# Patient Record
Sex: MALE | Race: WHITE | NOT HISPANIC OR LATINO | Employment: OTHER | URBAN - METROPOLITAN AREA
[De-identification: names, ages, dates, MRNs, and addresses within clinical notes are randomized per-mention and may not be internally consistent; named-entity substitution may affect disease eponyms.]

---

## 2018-04-04 ENCOUNTER — OFFICE VISIT CONVERTED (OUTPATIENT)
Dept: FAMILY MEDICINE CLINIC | Facility: CLINIC | Age: 68
End: 2018-04-04
Attending: FAMILY MEDICINE

## 2018-04-04 ENCOUNTER — CONVERSION ENCOUNTER (OUTPATIENT)
Dept: FAMILY MEDICINE CLINIC | Facility: CLINIC | Age: 68
End: 2018-04-04

## 2018-05-16 ENCOUNTER — CONVERSION ENCOUNTER (OUTPATIENT)
Dept: FAMILY MEDICINE CLINIC | Facility: CLINIC | Age: 68
End: 2018-05-16

## 2018-05-16 ENCOUNTER — OFFICE VISIT CONVERTED (OUTPATIENT)
Dept: FAMILY MEDICINE CLINIC | Facility: CLINIC | Age: 68
End: 2018-05-16
Attending: FAMILY MEDICINE

## 2018-08-21 ENCOUNTER — OFFICE VISIT CONVERTED (OUTPATIENT)
Dept: FAMILY MEDICINE CLINIC | Facility: CLINIC | Age: 68
End: 2018-08-21
Attending: FAMILY MEDICINE

## 2018-08-21 ENCOUNTER — CONVERSION ENCOUNTER (OUTPATIENT)
Dept: FAMILY MEDICINE CLINIC | Facility: CLINIC | Age: 68
End: 2018-08-21

## 2019-01-17 ENCOUNTER — HOSPITAL ENCOUNTER (OUTPATIENT)
Dept: FAMILY MEDICINE CLINIC | Facility: CLINIC | Age: 69
Discharge: HOME | End: 2019-01-17
Attending: FAMILY MEDICINE

## 2019-01-17 ENCOUNTER — OFFICE VISIT CONVERTED (OUTPATIENT)
Dept: FAMILY MEDICINE CLINIC | Facility: CLINIC | Age: 69
End: 2019-01-17
Attending: FAMILY MEDICINE

## 2019-01-17 LAB
ALBUMIN SERPL-MCNC: 4.2 G/DL (ref 3.5–5)
ALBUMIN/GLOB SERPL: 1.3 {RATIO} (ref 1.4–2.6)
ALP SERPL-CCNC: 67 U/L (ref 56–155)
ALT SERPL-CCNC: 16 U/L (ref 10–40)
ANION GAP SERPL CALC-SCNC: 21 MMOL/L (ref 8–19)
AST SERPL-CCNC: 19 U/L (ref 15–50)
BASOPHILS # BLD AUTO: 0.04 10*3/UL (ref 0–0.2)
BASOPHILS NFR BLD AUTO: 0.63 % (ref 0–3)
BILIRUB SERPL-MCNC: 0.37 MG/DL (ref 0.2–1.3)
BUN SERPL-MCNC: 11 MG/DL (ref 5–25)
BUN/CREAT SERPL: 10 {RATIO} (ref 6–20)
CALCIUM SERPL-MCNC: 10 MG/DL (ref 8.7–10.4)
CHLORIDE SERPL-SCNC: 102 MMOL/L (ref 99–111)
CHOLEST SERPL-MCNC: 165 MG/DL (ref 107–200)
CHOLEST/HDLC SERPL: 3.2 {RATIO} (ref 3–6)
CONV CO2: 23 MMOL/L (ref 22–32)
CONV TOTAL PROTEIN: 7.4 G/DL (ref 6.3–8.2)
CREAT UR-MCNC: 1.08 MG/DL (ref 0.7–1.2)
EOSINOPHIL # BLD AUTO: 0.25 10*3/UL (ref 0–0.7)
EOSINOPHIL # BLD AUTO: 3.64 % (ref 0–7)
ERYTHROCYTE [DISTWIDTH] IN BLOOD BY AUTOMATED COUNT: 11.3 % (ref 11.5–14.5)
GFR SERPLBLD BASED ON 1.73 SQ M-ARVRAT: >60 ML/MIN/{1.73_M2}
GLOBULIN UR ELPH-MCNC: 3.2 G/DL (ref 2–3.5)
GLUCOSE SERPL-MCNC: 145 MG/DL (ref 70–99)
HBA1C MFR BLD: 15.2 G/DL (ref 14–18)
HCT VFR BLD AUTO: 43 % (ref 42–52)
HDLC SERPL-MCNC: 52 MG/DL (ref 40–60)
LDLC SERPL CALC-MCNC: 79 MG/DL (ref 70–100)
LYMPHOCYTES # BLD AUTO: 1.9 10*3/UL (ref 1–5)
MCH RBC QN AUTO: 32.2 PG (ref 27–31)
MCHC RBC AUTO-ENTMCNC: 35.5 G/DL (ref 33–37)
MCV RBC AUTO: 90.9 FL (ref 80–96)
MONOCYTES # BLD AUTO: 0.8 10*3/UL (ref 0.2–1.2)
MONOCYTES NFR BLD AUTO: 11.7 % (ref 3–10)
NEUTROPHILS # BLD AUTO: 3.89 10*3/UL (ref 2–8)
NEUTROPHILS NFR BLD AUTO: 56.5 % (ref 30–85)
NRBC BLD AUTO-RTO: 0 % (ref 0–0.01)
OSMOLALITY SERPL CALC.SUM OF ELEC: 296 MOSM/KG (ref 273–304)
PLATELET # BLD AUTO: 216 10*3/UL (ref 130–400)
PMV BLD AUTO: 9.1 FL (ref 7.4–10.4)
POTASSIUM SERPL-SCNC: 4.4 MMOL/L (ref 3.5–5.3)
PSA SERPL-MCNC: 0.58 NG/ML (ref 0–4)
RBC # BLD AUTO: 4.73 10*6/UL (ref 4.7–6.1)
SODIUM SERPL-SCNC: 142 MMOL/L (ref 135–147)
TRIGL SERPL-MCNC: 168 MG/DL (ref 40–150)
VARIANT LYMPHS NFR BLD MANUAL: 27.6 % (ref 20–45)
VLDLC SERPL-MCNC: 34 MG/DL (ref 5–37)
WBC # BLD AUTO: 6.89 10*3/UL (ref 4.8–10.8)

## 2019-01-18 LAB
CONV HEPATITIS C AB WITH REFLEX TO CONFIRMATION: 0.1 S/CO RATIO (ref 0–0.9)
CONV HEPATITIS COMMENT: NORMAL

## 2019-01-22 ENCOUNTER — HOSPITAL ENCOUNTER (OUTPATIENT)
Dept: FAMILY MEDICINE CLINIC | Facility: CLINIC | Age: 69
Discharge: HOME | End: 2019-01-22
Attending: FAMILY MEDICINE

## 2019-01-22 LAB
EST. AVERAGE GLUCOSE BLD GHB EST-MCNC: 140 MG/DL
HBA1C MFR BLD: 6.5 % (ref 3.5–5.7)

## 2019-01-29 ENCOUNTER — CONVERSION ENCOUNTER (OUTPATIENT)
Dept: FAMILY MEDICINE CLINIC | Facility: CLINIC | Age: 69
End: 2019-01-29

## 2019-01-29 ENCOUNTER — OFFICE VISIT CONVERTED (OUTPATIENT)
Dept: FAMILY MEDICINE CLINIC | Facility: CLINIC | Age: 69
End: 2019-01-29
Attending: FAMILY MEDICINE

## 2019-04-02 ENCOUNTER — CONVERSION ENCOUNTER (OUTPATIENT)
Dept: FAMILY MEDICINE CLINIC | Facility: CLINIC | Age: 69
End: 2019-04-02

## 2019-04-02 ENCOUNTER — OFFICE VISIT CONVERTED (OUTPATIENT)
Dept: FAMILY MEDICINE CLINIC | Facility: CLINIC | Age: 69
End: 2019-04-02
Attending: FAMILY MEDICINE

## 2019-05-07 ENCOUNTER — HOSPITAL ENCOUNTER (OUTPATIENT)
Dept: LAB | Facility: HOSPITAL | Age: 69
Discharge: HOME | End: 2019-05-07
Attending: FAMILY MEDICINE

## 2019-05-07 LAB
EST. AVERAGE GLUCOSE BLD GHB EST-MCNC: 120 MG/DL
HBA1C MFR BLD: 5.8 % (ref 3.5–5.7)

## 2020-01-13 ENCOUNTER — OFFICE VISIT CONVERTED (OUTPATIENT)
Dept: FAMILY MEDICINE CLINIC | Facility: CLINIC | Age: 70
End: 2020-01-13
Attending: FAMILY MEDICINE

## 2020-01-13 ENCOUNTER — CONVERSION ENCOUNTER (OUTPATIENT)
Dept: FAMILY MEDICINE CLINIC | Facility: CLINIC | Age: 70
End: 2020-01-13

## 2020-01-13 ENCOUNTER — HOSPITAL ENCOUNTER (OUTPATIENT)
Dept: FAMILY MEDICINE CLINIC | Facility: CLINIC | Age: 70
Discharge: HOME | End: 2020-01-13
Attending: FAMILY MEDICINE

## 2020-01-13 LAB
EST. AVERAGE GLUCOSE BLD GHB EST-MCNC: 137 MG/DL
HBA1C MFR BLD: 6.4 % (ref 3.5–5.7)

## 2020-07-08 ENCOUNTER — OFFICE VISIT CONVERTED (OUTPATIENT)
Dept: FAMILY MEDICINE CLINIC | Facility: CLINIC | Age: 70
End: 2020-07-08
Attending: FAMILY MEDICINE

## 2020-08-19 ENCOUNTER — CONVERSION ENCOUNTER (OUTPATIENT)
Dept: FAMILY MEDICINE CLINIC | Facility: CLINIC | Age: 70
End: 2020-08-19

## 2020-08-19 ENCOUNTER — OFFICE VISIT CONVERTED (OUTPATIENT)
Dept: FAMILY MEDICINE CLINIC | Facility: CLINIC | Age: 70
End: 2020-08-19
Attending: FAMILY MEDICINE

## 2020-08-25 ENCOUNTER — CONVERSION ENCOUNTER (OUTPATIENT)
Dept: SURGERY | Facility: CLINIC | Age: 70
End: 2020-08-25

## 2020-08-25 ENCOUNTER — OFFICE VISIT CONVERTED (OUTPATIENT)
Dept: SURGERY | Facility: CLINIC | Age: 70
End: 2020-08-25
Attending: NURSE PRACTITIONER

## 2020-11-11 ENCOUNTER — HOSPITAL ENCOUNTER (OUTPATIENT)
Dept: PREADMISSION TESTING | Facility: HOSPITAL | Age: 70
Discharge: HOME | End: 2020-11-11
Attending: SURGERY

## 2020-11-13 LAB — SARS-COV-2 RNA SPEC QL NAA+PROBE: NOT DETECTED

## 2020-11-16 ENCOUNTER — HOSPITAL ENCOUNTER (OUTPATIENT)
Dept: GASTROENTEROLOGY | Facility: HOSPITAL | Age: 70
Setting detail: HOSPITAL OUTPATIENT SURGERY
Discharge: HOME | End: 2020-11-16
Attending: SURGERY

## 2021-05-10 NOTE — H&P
History and Physical      Patient Name: You Britton   Patient ID: [id number]   Sex: Male   YOB: 1950    Primary Care Provider: Aakash Pedersen III MD    Visit Date: August 25, 2020    Provider: KJ Mckenzie   Location[facility]   Location Address: [address] [place] [address]  [id number]   Location Phone: [phone]          Chief Complaint  · Requesting colonoscopy  · Age 50 or over  · Here today for a pre-surgical colon screening visit      History Of Present Illness  The patient is a 70 year old /White male presenting [facility] the [facility] on a referral from Riki Dowling MD.   You Britton needs to have a screening colonoscopy.   Patient states that they have had a colonoscopy. 10 years ago   Patient currently complains of: no complaints   Patient Does not have family history of colon cancer.      Patient presents today on referral from Dr. Riki Dowling.  Patient presents today without complaints for screening colonoscopy.  He denies any abdominal pain, diarrhea, or rectal bleeding.  He denies any family history of colorectal cancer.    Patient reports last colonoscopy was in 2010 and was normal.       Past Medical History  Disease Name Date Onset Notes   ***No Significant Medical History --  --    Diabetes mellitus type II, controlled 08/19/2020 --    High blood pressure 01/29/2019 --    High cholesterol 01/29/2019 --    Hyperlipidemia 05/16/2018 --    Hyperlipidemia NEC/NOS --  --    Hyperlipidemia NEC/NOS 09/03/2015 --    Itching with irritation 07/08/2020 --    Medication management 01/29/2019 --    New onset type 2 diabetes mellitus 01/29/2019 --    Poison ivy dermatitis 07/08/2020 --    Rash 07/08/2020 --          Past Surgical History  Procedure Name Date Notes   Arthroscopic Knee Surgery 1974 --          Medication List  Name Date Started Instructions   Maxzide-25mg 37.5-25 mg oral tablet 08/19/2020 TAKE ONE-HALF TABLET BY MOUTH DAILY    metformin 500 mg oral tablet extended release 24 hr 08/19/2020 take 1 tablet (500 mg) by oral route once daily with the evening meal for 90 days   simvastatin 20 mg oral tablet 08/19/2020 TAKE ONE TABLET BY MOUTH EVERY EVENING         Allergy List  Allergen Name Date Reaction Notes   NO KNOWN DRUG ALLERGIES --  --  --        Allergies Reconciled  Family Medical History  Disease Name Relative/Age Notes   Heart Disease Father/84  Mother/   --          Social History  Finding Status Start/Stop Quantity Notes   Active but no formal exercise --  --/-- --  --    Advance Care Plan/Surrogate Decision Maker scanned into EMR --  --/-- --  --    Alcohol Current some day 0/0 --  drinks weekly; beer   Caffeine Current every day 0/0 --  drinks regularly; coffee and soft drinks; 1-2 times per day    --  --/-- --  --    Second hand smoke exposure Never 0/0 --  no   Tobacco Never 0/0 --  never a smoker         Review of Systems  · Constitutional  o Denies  o : fever, chills  · Eyes  o Denies  o : yellowish discoloration of eyes  · HENT  o Denies  o : difficulty swallowing  · Cardiovascular  o Denies  o : chest pain, chest pain on exertion  · Respiratory  o Denies  o : shortness of breath  · Gastrointestinal  o Denies  o : nausea, vomiting, diarrhea, constipation  · Genitourinary  o Denies  o : abnormal color of urine  · Integument  o Denies  o : rash  · Neurologic  o Denies  o : tingling or numbness  · Musculoskeletal  o Denies  o : joint pain  · Endocrine  o Denies  o : weight gain, weight loss      Vitals  Date Time BP Position Site L\R Cuff Size HR RR TEMP (F) WT  HT  BMI kg/m2 BSA m2 O2 Sat HC       08/25/2020 10:15 AM       12  250lbs 4oz 6'   33.94 2.4           Physical Examination  · Constitutional  o Appearance  o : well developed, well-nourished, patient in no apparent distress  · Head and Face  o Head  o :   § Inspection  § : atraumatic, normocephalic  o Face  o :   § Inspection  § : no facial  lesions  · Eyes  o Conjunctivae  o : conjunctivae normal  o Sclerae  o : sclerae white  · Neck  o Inspection/Palpation  o : normal appearance, no masses or tenderness, trachea midline  · Respiratory  o Respiratory Effort  o : breathing unlabored  · Skin and Subcutaneous Tissue  o General Inspection  o : no lesions present, no areas of discoloration, skin turgor normal, texture normal  · Neurologic  o Mental Status Examination  o :   § Orientation  § : grossly oriented to person, place and time  § Attention  § : attention normal, concentration abilities normal  § Fund of Knowledge  § : fund of knowledge within normal limits, patient aware of current events  o Gait and Station  o : normal gait, able to stand without difficulty  · Psychiatric  o Judgement and Insight  o : judgment and insight intact  o Mood and Affect  o : mood normal, affect appropriate          Assessment  · Screening for colon cancer     V76.51/Z12.11  · Pre-op testing     V72.84/Z01.818      Plan  · Orders  o Consent for Colonoscopy Screening-Possible risk/complications, benefits, and alternatives to surgical or invasive procedure have been explained to patient and/or legal guardian. -Patient has been evaluated and can tolerate anesthesia and/or sedation. Risks, benefits, and alternatives to anesthesia and sedation have been explained to patient and/or legal guardian. () - V76.51/Z12.11, V72.84/Z01.818 - 11/16/2020  o [facility] Pre-Op Covid-19 Screening (27529) - V72.84/Z01.818 - 11/11/2020   [address] @ 10:45am  · Medications  o Medications have been Reconciled  o Transition of Care or Provider Policy  · Instructions  o Surgical Facility: [facility]  o Handouts Provided Pre-Procedure Instructions including date, time, and location of procedure.   o PLAN: Proceeed with colonoscopy. Patient understands risks/benefits and is willing to proceed.   o ***Surgical Orders***  o RISK AND BENEFITS:  o Given these options, the patient  has verbally expressed an understanding of the risks of the surgery and finds these risks acceptable. Will proceed with surgery as soon as possible.  o O.R. PREP: Per protocol   o IV: Per Anesthesia  o Please sign permit for: Colonoscopy with possible biopsies by Dr. Hudson.  o The above History and Physical Examination has been completed within 30 days of admission.  o ***Patient Status***  o Outpatient  o Follow up in the in the office post procedure.  o Advised patient he would need COVID-19 testing prior to procedure. Encourage patient to self isolate in between testing and procedure. Patient verbalizes understanding is willing to proceed.  o Electronically Identified Patient Education Materials Provided Electronically  · Disposition  o Call or Return if symptoms worsen or persist.            Electronically Signed by: KJ Mckenzie -Author on August 25, 2020 03:06:12 PM

## 2021-05-13 NOTE — PROGRESS NOTES
Progress Note      Patient Name: You Britton   Patient ID: [id number]   Sex: Male   YOB: 1950    Primary Care Provider: Aakash Pedersen III MD   Referring Provider: Aakash Pedersen III MD    Visit Date: August 19, 2020    Provider: Aakash Pedersen III MD   Location: [facility]   Location Address: [address] [place] [address]  [id number]   Location Phone: [phone]          Chief Complaint  · discuss dm medications       History Of Present Illness  You Britton is a 70 year old /White male who presents for evaluation and treatment of: would like to discuss medication for dm type II, he did not start the metformin.      HPI     patient is a 70-year-old with type 2 diabetes is before treated with just diet had lost over 30 pounds but has gained some of it back.  Is on simvastatin also Maxide for hypertension.  Needs to start taking metformin.  At least 1 a day.  Suppertime.  Metformin extended release    Review of systems     cardiovascular chest pain no palpitations   respiratory no shortness of breath dyspnea on exertion  GI patient is gained about 13 pounds from the 231 is up to 244 now.  Discussed a plant-based diet.    Physical exam      Weight is 248 there is a 4 pound weight gain blood pressure 134/66 temperature   cardiovascular regular rhythm no murmur  Increased work of breathing lungs are clear and equal bilaterally no rales no rhonchi no wheezes    Assessment/plan     type 2 diabetes needs to start metformin ER 1 at suppertime A1c in 3 months           Past Medical History  Disease Name Date Onset Notes   ***No Significant Medical History --  --    Diabetes mellitus type II, controlled 08/19/2020 --    High blood pressure 01/29/2019 --    High cholesterol 01/29/2019 --    Hyperlipidemia 05/16/2018 --    Hyperlipidemia NEC/NOS --  --    Hyperlipidemia NEC/NOS 09/03/2015 --    Itching with irritation 07/08/2020 --    Medication management 01/29/2019 --     New onset type 2 diabetes mellitus 01/29/2019 --    Poison ivy dermatitis 07/08/2020 --    Rash 07/08/2020 --          Past Surgical History  Procedure Name Date Notes   Arthroscopic Knee Surgery 1974 --          Medication List  Name Date Started Instructions   Maxzide-25mg 37.5-25 mg oral tablet 08/19/2020 TAKE ONE-HALF TABLET BY MOUTH DAILY   metformin 500 mg oral tablet extended release 24 hr 08/19/2020 take 1 tablet (500 mg) by oral route once daily with the evening meal for 90 days   simvastatin 20 mg oral tablet 08/19/2020 TAKE ONE TABLET BY MOUTH EVERY EVENING         Allergy List  Allergen Name Date Reaction Notes   NO KNOWN DRUG ALLERGIES --  --  --        Allergies Reconciled  Family Medical History  Disease Name Relative/Age Notes   Heart Disease Father/84  Mother/   --          Social History  Finding Status Start/Stop Quantity Notes   Active but no formal exercise --  --/-- --  --    Advance Care Plan/Surrogate Decision Maker scanned into EMR --  --/-- --  --    Alcohol Current some day 0/0 --  drinks weekly; beer   Caffeine Current every day 0/0 --  drinks regularly; coffee and soft drinks; 1-2 times per day    --  --/-- --  --    Second hand smoke exposure Never 0/0 --  no   Tobacco Never 0/0 --  never a smoker         Immunizations  NameDate Admin Mfg Trade Name Lot Number Route Inj VIS Given VIS Publication   Hepatitis A08/21/2018 SKB HAVRIX-ADULT  NE NE     Comments:    Bwswktled32/03/2017 [facility] Fluzone Quadrivalent [id number] IM LD 10/03/2017 08/07/2015   Comments:    Jtyzxtpgkxwd25/03/2015 WAL PREVNAR 13 [id number]  LD 09/03/2015 02/27/2013   Comments:    Amcgggooiaeh35/03/2015 WAL PREVNAR 13 [id number]  LD 09/03/2015 02/27/2013   Comments:    Spkifkra01/21/2018 NE Not Entered  NE NE     Comments: shingrix#1   Tdap05/16/2018 SKB BOOSTRIX [id number] IM LD 05/16/2018 02/24/2015   Comments: NDC [id number]         Review of Systems  · Constitutional  o * See HPI  · Eyes  o * See HPI  · HENT  o * See  HPI  · Breasts  o * See HPI  · Cardiovascular  o * See HPI  · Respiratory  o * See HPI  · Gastrointestinal  o * See HPI  · Genitourinary  o * See HPI  · Integument  o * See HPI  · Neurologic  o * See HPI  · Musculoskeletal  o * See HPI  · Endocrine  o * See HPI  · Psychiatric  o * See HPI  · Heme-Lymph  o * See HPI  · Allergic-Immunologic  o * See HPI      Vitals  Date Time BP Position Site L\R Cuff Size HR RR TEMP (F) WT  HT  BMI kg/m2 BSA m2 O2 Sat HC       08/19/2020 02:40 /66 Sitting      97.2 248lbs 0oz 6'   33.63 2.39                   Assessment  · High blood pressure     401.9/I10  · High cholesterol     272.0/E78.00  · Medication management     V58.69/Z79.899  · Diabetes mellitus type II, controlled     250.00/E11.9    Problems Reconciled  Plan  · Orders  o ACO-39: Current medications updated and reviewed () - - 08/19/2020  · Medications  o Maxzide-25mg 37.5-25 mg oral tablet   SIG: TAKE ONE-HALF TABLET BY MOUTH DAILY   DISP: (45) Tablet with 1 refills  Refilled on 08/19/2020     o metformin 500 mg oral tablet extended release 24 hr   SIG: take 1 tablet (500 mg) by oral route once daily with the evening meal for 90 days   DISP: (90) tablets with 1 refills  Refilled on 08/19/2020     o simvastatin 20 mg oral tablet   SIG: TAKE ONE TABLET BY MOUTH EVERY EVENING   DISP: (90) Tablet with 1 refills  Refilled on 08/19/2020     o prednisone 20 mg oral tablet   SIG: take 2 tablets (40 mg) by oral route once daily for 5 days then 1 a day for 5 days.   DISP: (20) tablets with 0 refills  Discontinued on 08/19/2020     o Medications have been Reconciled  o Transition of Care or Provider Policy  · Instructions  o Patient is taking medications as prescribed and doing well.   o Take all medications as prescribed/directed.  o Patient instructed/educated on their diet and exercise program.  o Patient was educated/instructed on their diagnosis, treatment and medications prior to discharge from the clinic  today.  o Bring all medicines with their bottles to each office visit.  o Time spent with the patient was 20 minutes, more than 50% face to face.  o Chronic conditions reviewed and taken into consideration for today's treatment plan.  o Discussed Covid-19 precautions including, but not limited to, social distancing, avoid touching your face, and hand washing.             Electronically Signed by: Caitlyn Gresham, -Author on August 19, 2020 06:05:39 PM  Electronically Co-signed by: Aakash Pedersen III MD -Reviewer on August 20, 2020 01:45:55 PM

## 2021-05-13 NOTE — PROGRESS NOTES
Progress Note      Patient Name: You Britton   Patient ID: [id number]   Sex: Male   YOB: 1950    Primary Care Provider: Aakash Pedersen III MD   Referring Provider: Aakash Pedersen III MD    Visit Date: July 8, 2020    Provider: Aakash Pedersen III MD   Location: [facility]   Location Address: [address] [place] [address]  [id number]   Location Phone: [phone]          Chief Complaint  · poison ivy for one week      History Of Present Illness  You Britton is a 69 year old /White male who presents for evaluation and treatment of:      HPI     patient is a 69-year-old here with poison ivy on his right arm for 1 week, it seems to be spreading.  He cut down a tree in the yard last week, He has been using otc ointment and not getting any relief. Has a contact dermatitis from poison ivy, On his right arm.  No other problems. type 2 diabetes and has hypertension.    review of systems    skin rash on right arm and wrist    respiratory no problems with his breathing    Physical exam     weight is 244 blood pressure 128/74 temperature 98  General no distress  Skin linear areas of the right arm, no cellulitis noted.    Assessment     contact dermatitis poison ivy right arm, only week old will give clobetasol and prednisone    Plan     clobetasol twice daily for no more than 2 weeks.       Past Medical History  Disease Name Date Onset Notes   ***No Significant Medical History --  --    High blood pressure 01/29/2019 --    High cholesterol 01/29/2019 --    Hyperlipidemia 05/16/2018 --    Hyperlipidemia NEC/NOS --  --    Hyperlipidemia NEC/NOS 09/03/2015 --    Itching with irritation 07/08/2020 --    Medication management 01/29/2019 --    New onset type 2 diabetes mellitus 01/29/2019 --    Poison ivy dermatitis 07/08/2020 --    Rash 07/08/2020 --          Past Surgical History  Procedure Name Date Notes   Arthroscopic Knee Surgery 1974 --          Medication List  Name Date  Started Instructions   Maxzide-25mg 37.5-25 mg oral tablet 03/19/2020 TAKE ONE-HALF TABLET BY MOUTH DAILY   metformin 500 mg oral tablet extended release 24 hr 01/29/2019 take 1 tablet (500 mg) by oral route once daily with the evening meal for 90 days   prednisone 20 mg oral tablet 07/08/2020 take 2 tablets (40 mg) by oral route once daily for 5 days then 1 a day for 5 days.   simvastatin 20 mg oral tablet 03/19/2020 TAKE ONE TABLET BY MOUTH EVERY EVENING         Allergy List  Allergen Name Date Reaction Notes   NO KNOWN DRUG ALLERGIES --  --  --        Allergies Reconciled  Family Medical History  Disease Name Relative/Age Notes   Heart Disease Father/84  Mother/   --          Social History  Finding Status Start/Stop Quantity Notes   Active but no formal exercise --  --/-- --  --    Alcohol Current some day 0/0 --  drinks weekly; beer   Caffeine Current every day 0/0 --  drinks regularly; coffee and soft drinks; 1-2 times per day    --  --/-- --  --    Second hand smoke exposure Never 0/0 --  no   Tobacco Never 0/0 --  never a smoker         Immunizations  NameDate Admin Mfg Trade Name Lot Number Route Inj VIS Given VIS Publication   Hepatitis A08/21/2018 [facility] HAVRIX-ADULT  NE NE     Comments:    Iniimyrhx12/03/2017 [facility] Fluzone Quadrivalent [id number] [facility] 10/03/2017 08/07/2015   Comments:    Qqqxoyegstac99/03/2015 WAL PREVNAR 13 [id number] [facility] 09/03/2015 02/27/2013   Comments:    Kjhpkszqojui45/03/2015 WAL PREVNAR 13 [id number] [facility] 09/03/2015 02/27/2013   Comments:    Sieiuotz55/21/2018 NE Not Entered  NE NE     Comments: shingrix#1   Tdap05/16/2018 [facility] BOOSTRIX [id number] IM LD 05/16/2018 02/24/2015   Comments: NDC [id number]         Review of Systems  · Constitutional  o * See HPI  · Eyes  o * See HPI  · HENT  o * See HPI  · Breasts  o * See HPI  · Cardiovascular  o * See HPI  · Respiratory  o * See HPI  · Gastrointestinal  o * See HPI  · Genitourinary  o * See HPI  · Integument  o * See HPI  · Neurologic  o * See  HPI  · Musculoskeletal  o * See HPI  · Endocrine  o * See HPI  · Psychiatric  o * See HPI  · Heme-Lymph  o * See HPI  · Allergic-Immunologic  o * See HPI      Vitals  Date Time BP Position Site L\R Cuff Size HR RR TEMP (F) WT  HT  BMI kg/m2 BSA m2 O2 Sat HC       07/08/2020 03:53 /74 Sitting      98.2 243lbs 16oz 6'   33.09 2.37               Assessment  · High blood pressure     401.9/I10  · High cholesterol     272.0/E78.00  · Medication management     V58.69/Z79.899  · New onset type 2 diabetes mellitus     250.00/E11.9  · Poison ivy dermatitis     692.6/L23.7  · Rash     782.1/R21  · Itching with irritation     698.9/L29.9    Problems Reconciled  Plan  · Orders  o ACO - Pt declines to or was not able to provide an Advance Care Plan or name a Surrogate Decision Maker (1124F) - - 07/08/2020  o ACO-39: Current medications updated and reviewed () - - 07/08/2020  · Medications  o prednisone 20 mg oral tablet   SIG: take 2 tablets (40 mg) by oral route once daily for 5 days then 1 a day for 5 days.   DISP: (20) tablets with 0 refills  Adjusted on 07/08/2020     o Medications have been Reconciled  o Transition of Care or Provider Policy  · Instructions  o Patient is taking medications as prescribed and doing well.   o Take all medications as prescribed/directed.  o Patient instructed/educated on their diet and exercise program.  o Patient was educated/instructed on their diagnosis, treatment and medications prior to discharge from the clinic today.  o Bring all medicines with their bottles to each office visit.  o Time spent with the patient was 20 minutes, more than 50% face to face.  o Chronic conditions reviewed and taken into consideration for today's treatment plan.  o Discussed Covid-19 precautions including, but not limited to, social distancing, avoid touching your face, and hand washing.             Electronically Signed by: Caitlyn Gresham, -Author on July 8, 2020 06:23:39 PM  Electronically  Co-signed by: Aakash Pedersen III MD -Reviewer on July 9, 2020 02:07:13 PM

## 2021-05-14 VITALS — HEIGHT: 72 IN | RESPIRATION RATE: 12 BRPM | WEIGHT: 250.25 LBS | BODY MASS INDEX: 33.89 KG/M2

## 2021-05-15 VITALS
WEIGHT: 248 LBS | BODY MASS INDEX: 33.59 KG/M2 | HEIGHT: 72 IN | TEMPERATURE: 97.2 F | DIASTOLIC BLOOD PRESSURE: 66 MMHG | SYSTOLIC BLOOD PRESSURE: 134 MMHG

## 2021-05-15 VITALS
SYSTOLIC BLOOD PRESSURE: 130 MMHG | HEIGHT: 72 IN | DIASTOLIC BLOOD PRESSURE: 70 MMHG | WEIGHT: 231 LBS | BODY MASS INDEX: 31.29 KG/M2

## 2021-05-15 VITALS
TEMPERATURE: 98.2 F | WEIGHT: 244 LBS | SYSTOLIC BLOOD PRESSURE: 128 MMHG | BODY MASS INDEX: 33.05 KG/M2 | HEIGHT: 72 IN | DIASTOLIC BLOOD PRESSURE: 74 MMHG

## 2021-05-15 VITALS
SYSTOLIC BLOOD PRESSURE: 128 MMHG | WEIGHT: 244 LBS | DIASTOLIC BLOOD PRESSURE: 72 MMHG | BODY MASS INDEX: 33.05 KG/M2 | HEIGHT: 72 IN | TEMPERATURE: 98.1 F

## 2021-05-16 VITALS
SYSTOLIC BLOOD PRESSURE: 118 MMHG | DIASTOLIC BLOOD PRESSURE: 70 MMHG | HEIGHT: 72 IN | WEIGHT: 252 LBS | BODY MASS INDEX: 34.13 KG/M2

## 2021-05-16 VITALS
DIASTOLIC BLOOD PRESSURE: 86 MMHG | SYSTOLIC BLOOD PRESSURE: 148 MMHG | HEIGHT: 72 IN | BODY MASS INDEX: 33.86 KG/M2 | WEIGHT: 250 LBS

## 2021-05-16 VITALS
BODY MASS INDEX: 33.32 KG/M2 | DIASTOLIC BLOOD PRESSURE: 70 MMHG | HEIGHT: 72 IN | SYSTOLIC BLOOD PRESSURE: 118 MMHG | WEIGHT: 246 LBS

## 2021-05-16 VITALS
HEIGHT: 72 IN | DIASTOLIC BLOOD PRESSURE: 80 MMHG | WEIGHT: 247 LBS | BODY MASS INDEX: 33.46 KG/M2 | SYSTOLIC BLOOD PRESSURE: 126 MMHG

## 2021-05-16 VITALS
DIASTOLIC BLOOD PRESSURE: 70 MMHG | HEIGHT: 72 IN | SYSTOLIC BLOOD PRESSURE: 122 MMHG | WEIGHT: 255 LBS | BODY MASS INDEX: 34.54 KG/M2

## 2021-06-28 RX ORDER — SIMVASTATIN 20 MG
TABLET ORAL
Qty: 90 TABLET | Refills: 0 | OUTPATIENT
Start: 2021-06-28

## 2021-06-28 RX ORDER — TRIAMTERENE AND HYDROCHLOROTHIAZIDE 37.5; 25 MG/1; MG/1
TABLET ORAL
Qty: 45 TABLET | Refills: 0 | OUTPATIENT
Start: 2021-06-28

## 2021-06-28 NOTE — TELEPHONE ENCOUNTER
Pt needs appt for dm and also lab work, pt never returned for labs after 8/2020 visit as instructed.

## 2021-07-29 ENCOUNTER — TELEPHONE (OUTPATIENT)
Dept: FAMILY MEDICINE CLINIC | Facility: CLINIC | Age: 71
End: 2021-07-29

## 2021-07-29 NOTE — TELEPHONE ENCOUNTER
Caller: JEANNE MC    Relationship: Emergency Contact    Best call back number: [phone]    What orders are you requesting (i.e. lab or imaging):  BLOOD WORK    In what timeframe would the patient need to come in: DURING APPOINTMENT ON 08/03/21    Where will you receive your lab/imaging services: *IN OFFICE

## 2021-08-03 ENCOUNTER — OFFICE VISIT (OUTPATIENT)
Dept: FAMILY MEDICINE CLINIC | Facility: CLINIC | Age: 71
End: 2021-08-03

## 2021-08-03 VITALS
WEIGHT: 248 LBS | HEIGHT: 72 IN | HEART RATE: 66 BPM | BODY MASS INDEX: 33.59 KG/M2 | SYSTOLIC BLOOD PRESSURE: 110 MMHG | DIASTOLIC BLOOD PRESSURE: 70 MMHG | TEMPERATURE: 98.1 F | OXYGEN SATURATION: 95 %

## 2021-08-03 DIAGNOSIS — Z13.220 ENCOUNTER FOR LIPID SCREENING FOR CARDIOVASCULAR DISEASE: ICD-10-CM

## 2021-08-03 DIAGNOSIS — Z79.899 MEDICATION MANAGEMENT: ICD-10-CM

## 2021-08-03 DIAGNOSIS — Z00.00 ROUTINE ADULT HEALTH MAINTENANCE: Primary | ICD-10-CM

## 2021-08-03 DIAGNOSIS — E78.00 HIGH CHOLESTEROL: ICD-10-CM

## 2021-08-03 DIAGNOSIS — Z12.5 PROSTATE CANCER SCREENING: ICD-10-CM

## 2021-08-03 DIAGNOSIS — Z13.6 ENCOUNTER FOR LIPID SCREENING FOR CARDIOVASCULAR DISEASE: ICD-10-CM

## 2021-08-03 DIAGNOSIS — Z11.59 NEED FOR HEPATITIS C SCREENING TEST: ICD-10-CM

## 2021-08-03 DIAGNOSIS — I10 HYPERTENSION, UNSPECIFIED TYPE: ICD-10-CM

## 2021-08-03 DIAGNOSIS — E11.9 TYPE 2 DIABETES MELLITUS WITHOUT COMPLICATION, WITHOUT LONG-TERM CURRENT USE OF INSULIN (HCC): ICD-10-CM

## 2021-08-03 PROBLEM — L29.9 ITCHING WITH IRRITATION: Status: RESOLVED | Noted: 2020-07-08 | Resolved: 2021-08-03

## 2021-08-03 PROBLEM — L29.9 ITCHING WITH IRRITATION: Status: ACTIVE | Noted: 2020-07-08

## 2021-08-03 PROBLEM — L23.7 POISON IVY DERMATITIS: Status: ACTIVE | Noted: 2020-07-08

## 2021-08-03 LAB
ALBUMIN SERPL-MCNC: 4.6 G/DL (ref 3.5–5.2)
ALBUMIN/GLOB SERPL: 1.5 G/DL
ALP SERPL-CCNC: 89 U/L (ref 39–117)
ALT SERPL W P-5'-P-CCNC: 18 U/L (ref 1–41)
ANION GAP SERPL CALCULATED.3IONS-SCNC: 10.4 MMOL/L (ref 5–15)
AST SERPL-CCNC: 22 U/L (ref 1–40)
BASOPHILS # BLD AUTO: 0.07 10*3/MM3 (ref 0–0.2)
BASOPHILS NFR BLD AUTO: 0.8 % (ref 0–1.5)
BILIRUB BLD-MCNC: NEGATIVE MG/DL
BILIRUB SERPL-MCNC: 0.4 MG/DL (ref 0–1.2)
BUN SERPL-MCNC: 17 MG/DL (ref 8–23)
BUN/CREAT SERPL: 17 (ref 7–25)
CALCIUM SPEC-SCNC: 10 MG/DL (ref 8.6–10.5)
CHLORIDE SERPL-SCNC: 101 MMOL/L (ref 98–107)
CHOLEST SERPL-MCNC: 200 MG/DL (ref 0–200)
CLARITY, POC: CLEAR
CO2 SERPL-SCNC: 24.6 MMOL/L (ref 22–29)
COLOR UR: YELLOW
CREAT SERPL-MCNC: 1 MG/DL (ref 0.76–1.27)
DEPRECATED RDW RBC AUTO: 40.7 FL (ref 37–54)
EOSINOPHIL # BLD AUTO: 0.31 10*3/MM3 (ref 0–0.4)
EOSINOPHIL NFR BLD AUTO: 3.8 % (ref 0.3–6.2)
ERYTHROCYTE [DISTWIDTH] IN BLOOD BY AUTOMATED COUNT: 12.2 % (ref 12.3–15.4)
GFR SERPL CREATININE-BSD FRML MDRD: 74 ML/MIN/1.73
GLOBULIN UR ELPH-MCNC: 3.1 GM/DL
GLUCOSE SERPL-MCNC: 165 MG/DL (ref 65–99)
GLUCOSE UR STRIP-MCNC: NEGATIVE MG/DL
HBA1C MFR BLD: 7 % (ref 4.8–5.6)
HCT VFR BLD AUTO: 46.5 % (ref 37.5–51)
HCV AB SER DONR QL: NORMAL
HDLC SERPL-MCNC: 55 MG/DL (ref 40–60)
HGB BLD-MCNC: 15.4 G/DL (ref 13–17.7)
IMM GRANULOCYTES # BLD AUTO: 0.03 10*3/MM3 (ref 0–0.05)
IMM GRANULOCYTES NFR BLD AUTO: 0.4 % (ref 0–0.5)
KETONES UR QL: NEGATIVE
LDLC SERPL CALC-MCNC: 114 MG/DL (ref 0–100)
LDLC/HDLC SERPL: 2 {RATIO}
LEUKOCYTE EST, POC: NEGATIVE
LYMPHOCYTES # BLD AUTO: 2.09 10*3/MM3 (ref 0.7–3.1)
LYMPHOCYTES NFR BLD AUTO: 25.4 % (ref 19.6–45.3)
MCH RBC QN AUTO: 30.1 PG (ref 26.6–33)
MCHC RBC AUTO-ENTMCNC: 33.1 G/DL (ref 31.5–35.7)
MCV RBC AUTO: 90.8 FL (ref 79–97)
MONOCYTES # BLD AUTO: 0.99 10*3/MM3 (ref 0.1–0.9)
MONOCYTES NFR BLD AUTO: 12 % (ref 5–12)
NEUTROPHILS NFR BLD AUTO: 4.75 10*3/MM3 (ref 1.7–7)
NEUTROPHILS NFR BLD AUTO: 57.6 % (ref 42.7–76)
NITRITE UR-MCNC: NEGATIVE MG/ML
NRBC BLD AUTO-RTO: 0 /100 WBC (ref 0–0.2)
PH UR: 5 [PH] (ref 5–8)
PLATELET # BLD AUTO: 235 10*3/MM3 (ref 140–450)
PMV BLD AUTO: 11.8 FL (ref 6–12)
POTASSIUM SERPL-SCNC: 4.3 MMOL/L (ref 3.5–5.2)
PROT SERPL-MCNC: 7.7 G/DL (ref 6–8.5)
PROT UR STRIP-MCNC: NEGATIVE MG/DL
PSA SERPL-MCNC: 0.64 NG/ML (ref 0–4)
RBC # BLD AUTO: 5.12 10*6/MM3 (ref 4.14–5.8)
RBC # UR STRIP: NEGATIVE /UL
SODIUM SERPL-SCNC: 136 MMOL/L (ref 136–145)
SP GR UR: 1.02 (ref 1–1.03)
TRIGL SERPL-MCNC: 175 MG/DL (ref 0–150)
TSH SERPL DL<=0.05 MIU/L-ACNC: 1.7 UIU/ML (ref 0.27–4.2)
UROBILINOGEN UR QL: NORMAL
VLDLC SERPL-MCNC: 31 MG/DL (ref 5–40)
WBC # BLD AUTO: 8.24 10*3/MM3 (ref 3.4–10.8)

## 2021-08-03 PROCEDURE — 81003 URINALYSIS AUTO W/O SCOPE: CPT | Performed by: FAMILY MEDICINE

## 2021-08-03 PROCEDURE — 82570 ASSAY OF URINE CREATININE: CPT | Performed by: FAMILY MEDICINE

## 2021-08-03 PROCEDURE — 83036 HEMOGLOBIN GLYCOSYLATED A1C: CPT | Performed by: FAMILY MEDICINE

## 2021-08-03 PROCEDURE — 82043 UR ALBUMIN QUANTITATIVE: CPT | Performed by: FAMILY MEDICINE

## 2021-08-03 PROCEDURE — 84443 ASSAY THYROID STIM HORMONE: CPT | Performed by: FAMILY MEDICINE

## 2021-08-03 PROCEDURE — G0103 PSA SCREENING: HCPCS | Performed by: FAMILY MEDICINE

## 2021-08-03 PROCEDURE — 99214 OFFICE O/P EST MOD 30 MIN: CPT | Performed by: FAMILY MEDICINE

## 2021-08-03 PROCEDURE — 80053 COMPREHEN METABOLIC PANEL: CPT | Performed by: FAMILY MEDICINE

## 2021-08-03 PROCEDURE — 80061 LIPID PANEL: CPT | Performed by: FAMILY MEDICINE

## 2021-08-03 PROCEDURE — 85025 COMPLETE CBC W/AUTO DIFF WBC: CPT | Performed by: FAMILY MEDICINE

## 2021-08-03 PROCEDURE — 86803 HEPATITIS C AB TEST: CPT | Performed by: FAMILY MEDICINE

## 2021-08-03 PROCEDURE — 36415 COLL VENOUS BLD VENIPUNCTURE: CPT | Performed by: FAMILY MEDICINE

## 2021-08-03 RX ORDER — ATORVASTATIN CALCIUM 40 MG/1
40 TABLET, FILM COATED ORAL DAILY
Qty: 90 TABLET | Refills: 3 | Status: SHIPPED | OUTPATIENT
Start: 2021-08-03

## 2021-08-03 RX ORDER — METFORMIN HYDROCHLORIDE 500 MG/1
500 TABLET, EXTENDED RELEASE ORAL
COMMUNITY
End: 2021-08-09 | Stop reason: SDUPTHER

## 2021-08-03 RX ORDER — SIMVASTATIN 20 MG
20 TABLET ORAL NIGHTLY
COMMUNITY
End: 2021-08-03

## 2021-08-03 RX ORDER — TRIAMTERENE AND HYDROCHLOROTHIAZIDE 37.5; 25 MG/1; MG/1
0.5 TABLET ORAL DAILY
COMMUNITY
End: 2021-08-09

## 2021-08-03 NOTE — PROGRESS NOTES
Pt did not bring all of his medications with him today, he states he knows the name of the meds but not the strengths of them. We will not be able to refill meds today he will need to have the pharmacy to request them.

## 2021-08-03 NOTE — PROGRESS NOTES
"Chief Complaint  Med Refill    Subjective          You Britton presents to [facility]  History of Present Illness  70-year-old history of hypertension hypercholesterolemia type 2 diabetesHere for his 6-month diabetic visit and his refill of his hypercholesterolemia medicines and his hypertension medicines    No Known Allergies     Past Surgical History:   • KNEE SURGERY    Arthroscopic       Social History     Tobacco Use   • Smoking status: Never Smoker   • Smokeless tobacco: Never Used   Substance Use Topics   • Alcohol use: Yes     Comment: beer weekly       Family History   Problem Relation Age of Onset   • Heart disease Mother    • Heart disease Father         Health Maintenance Due   Topic Date Due   • URINE MICROALBUMIN  Never done   • Pneumococcal Vaccine 65+ (1 of 2 - PPSV23) 10/29/2015   • ZOSTER VACCINE (2 of 2) 10/16/2018   • HEPATITIS C SCREENING  Never done   • ANNUAL WELLNESS VISIT  Never done   • DIABETIC FOOT EXAM  Never done   • HEMOGLOBIN A1C  06/28/2021      Last Completed Colonoscopy     This patient has no relevant Health Maintenance data.          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      Physical Exam  General no distress  cardiovascular regular rhythm no murmur  respiratory lungs clear and equal bilaterally  rectal 1+ prostate hypertrophy no nodules no masses  skin no lesions  Result Review :              Assessment and Plan    hypertension controlled hypercholesterolemia will change to " atorvastatin 40 mg we will call it in prostate screen is negative await PSA type 2 diabetes had an A1c                Patient was given instructions and counseling regarding his condition or for health maintenance advice. Please see specific information pulled into the AVS if appropriate.

## 2021-08-04 LAB
ALBUMIN UR-MCNC: <1.2 MG/DL
CREAT UR-MCNC: 120.6 MG/DL
MICROALBUMIN/CREAT UR: NORMAL MG/G{CREAT}

## 2021-08-09 RX ORDER — METFORMIN HYDROCHLORIDE 500 MG/1
500 TABLET, EXTENDED RELEASE ORAL
Qty: 90 TABLET | Refills: 1 | Status: SHIPPED | OUTPATIENT
Start: 2021-08-09 | End: 2022-06-07

## 2021-08-09 RX ORDER — TRIAMTERENE AND HYDROCHLOROTHIAZIDE 37.5; 25 MG/1; MG/1
TABLET ORAL
Qty: 45 TABLET | Refills: 3 | Status: SHIPPED | OUTPATIENT
Start: 2021-08-09

## 2021-09-14 NOTE — TELEPHONE ENCOUNTER
Pt called wanting to know why there was a change in cholesterol medication- I told him dr tai wanted to change due to his hyperlipidemia

## 2022-06-07 RX ORDER — METFORMIN HYDROCHLORIDE 500 MG/1
TABLET, EXTENDED RELEASE ORAL
Qty: 30 TABLET | Refills: 0 | Status: SHIPPED | OUTPATIENT
Start: 2022-06-07

## 2022-10-13 RX ORDER — TRIAMTERENE AND HYDROCHLOROTHIAZIDE 37.5; 25 MG/1; MG/1
TABLET ORAL
Qty: 45 TABLET | Refills: 3 | OUTPATIENT
Start: 2022-10-13

## 2022-10-13 RX ORDER — ATORVASTATIN CALCIUM 40 MG/1
TABLET, FILM COATED ORAL
Qty: 90 TABLET | Refills: 3 | OUTPATIENT
Start: 2022-10-13

## 2022-10-13 NOTE — TELEPHONE ENCOUNTER
Patient is seeing Internist at    Carlos Amanda MD    [address] [facility] [address] [phone] (Work)